# Patient Record
Sex: FEMALE
[De-identification: names, ages, dates, MRNs, and addresses within clinical notes are randomized per-mention and may not be internally consistent; named-entity substitution may affect disease eponyms.]

---

## 2024-06-19 PROBLEM — Z00.00 ENCOUNTER FOR PREVENTIVE HEALTH EXAMINATION: Status: ACTIVE | Noted: 2024-06-19

## 2024-06-25 ENCOUNTER — APPOINTMENT (OUTPATIENT)
Dept: INTERNAL MEDICINE | Facility: CLINIC | Age: 24
End: 2024-06-25
Payer: COMMERCIAL

## 2024-06-25 ENCOUNTER — NON-APPOINTMENT (OUTPATIENT)
Age: 24
End: 2024-06-25

## 2024-06-25 VITALS
DIASTOLIC BLOOD PRESSURE: 73 MMHG | HEIGHT: 70 IN | TEMPERATURE: 98.1 F | BODY MASS INDEX: 36.22 KG/M2 | SYSTOLIC BLOOD PRESSURE: 112 MMHG | WEIGHT: 253 LBS | HEART RATE: 91 BPM

## 2024-06-25 DIAGNOSIS — R06.02 SHORTNESS OF BREATH: ICD-10-CM

## 2024-06-25 DIAGNOSIS — Z82.49 FAMILY HISTORY OF ISCHEMIC HEART DISEASE AND OTHER DISEASES OF THE CIRCULATORY SYSTEM: ICD-10-CM

## 2024-06-25 DIAGNOSIS — R55 SYNCOPE AND COLLAPSE: ICD-10-CM

## 2024-06-25 DIAGNOSIS — Z80.3 FAMILY HISTORY OF MALIGNANT NEOPLASM OF BREAST: ICD-10-CM

## 2024-06-25 DIAGNOSIS — E66.9 OBESITY, UNSPECIFIED: ICD-10-CM

## 2024-06-25 DIAGNOSIS — N62 HYPERTROPHY OF BREAST: ICD-10-CM

## 2024-06-25 DIAGNOSIS — Z01.818 ENCOUNTER FOR OTHER PREPROCEDURAL EXAMINATION: ICD-10-CM

## 2024-06-25 DIAGNOSIS — Z87.39 PERSONAL HISTORY OF OTHER DISEASES OF THE MUSCULOSKELETAL SYSTEM AND CONNECTIVE TISSUE: ICD-10-CM

## 2024-06-25 DIAGNOSIS — Z86.59 PERSONAL HISTORY OF OTHER MENTAL AND BEHAVIORAL DISORDERS: ICD-10-CM

## 2024-06-25 PROCEDURE — 36415 COLL VENOUS BLD VENIPUNCTURE: CPT

## 2024-06-25 PROCEDURE — G2211 COMPLEX E/M VISIT ADD ON: CPT

## 2024-06-25 PROCEDURE — 99204 OFFICE O/P NEW MOD 45 MIN: CPT

## 2024-06-25 RX ORDER — METHYLPHENIDATE HYDROCHLORIDE 36 MG/1
36 TABLET, EXTENDED RELEASE ORAL DAILY
Refills: 0 | Status: ACTIVE | COMMUNITY

## 2024-06-26 PROBLEM — R06.02 SHORTNESS OF BREATH ON EXERTION: Status: ACTIVE | Noted: 2024-06-26

## 2024-06-26 PROBLEM — R55 PRE-SYNCOPE: Status: ACTIVE | Noted: 2024-06-26

## 2024-06-26 PROBLEM — E66.9 OBESITY: Status: ACTIVE | Noted: 2024-06-26

## 2024-06-26 LAB
ALBUMIN SERPL ELPH-MCNC: 4.6 G/DL
ALP BLD-CCNC: 54 U/L
ALT SERPL-CCNC: 15 U/L
ANION GAP SERPL CALC-SCNC: 13 MMOL/L
APPEARANCE: CLEAR
APTT BLD: 33.1 SEC
AST SERPL-CCNC: 19 U/L
BILIRUB SERPL-MCNC: 0.4 MG/DL
BILIRUBIN URINE: NEGATIVE
BLOOD URINE: NEGATIVE
BUN SERPL-MCNC: 12 MG/DL
CALCIUM SERPL-MCNC: 9.6 MG/DL
CHLORIDE SERPL-SCNC: 104 MMOL/L
CHOLEST SERPL-MCNC: 185 MG/DL
CO2 SERPL-SCNC: 22 MMOL/L
COLOR: YELLOW
CREAT SERPL-MCNC: 0.97 MG/DL
EGFR: 85 ML/MIN/1.73M2
ESTIMATED AVERAGE GLUCOSE: 94 MG/DL
GLUCOSE QUALITATIVE U: NEGATIVE MG/DL
GLUCOSE SERPL-MCNC: 90 MG/DL
HBA1C MFR BLD HPLC: 4.9 %
HCG SERPL-MCNC: <1 MIU/ML
HCT VFR BLD CALC: 40.4 %
HDLC SERPL-MCNC: 76 MG/DL
HGB BLD-MCNC: 13.2 G/DL
INR PPP: 0.95 RATIO
KETONES URINE: NEGATIVE MG/DL
LDLC SERPL CALC-MCNC: 100 MG/DL
LEUKOCYTE ESTERASE URINE: NEGATIVE
MCHC RBC-ENTMCNC: 31.4 PG
MCHC RBC-ENTMCNC: 32.7 GM/DL
MCV RBC AUTO: 96.2 FL
NITRITE URINE: NEGATIVE
NONHDLC SERPL-MCNC: 109 MG/DL
PH URINE: 6
PLATELET # BLD AUTO: 393 K/UL
POTASSIUM SERPL-SCNC: 4.6 MMOL/L
PROT SERPL-MCNC: 7.9 G/DL
PROTEIN URINE: NEGATIVE MG/DL
PT BLD: 10.7 SEC
RBC # BLD: 4.2 M/UL
RBC # FLD: 12.5 %
SODIUM SERPL-SCNC: 140 MMOL/L
SPECIFIC GRAVITY URINE: 1.02
TRIGL SERPL-MCNC: 44 MG/DL
UROBILINOGEN URINE: 0.2 MG/DL
WBC # FLD AUTO: 5.01 K/UL

## 2024-07-17 ENCOUNTER — APPOINTMENT (OUTPATIENT)
Dept: INTERNAL MEDICINE | Facility: CLINIC | Age: 24
End: 2024-07-17

## 2024-07-29 ENCOUNTER — APPOINTMENT (OUTPATIENT)
Dept: INTERNAL MEDICINE | Facility: CLINIC | Age: 24
End: 2024-07-29
Payer: COMMERCIAL

## 2024-07-29 ENCOUNTER — NON-APPOINTMENT (OUTPATIENT)
Age: 24
End: 2024-07-29

## 2024-07-29 DIAGNOSIS — Z01.818 ENCOUNTER FOR OTHER PREPROCEDURAL EXAMINATION: ICD-10-CM

## 2024-07-29 PROCEDURE — G2211 COMPLEX E/M VISIT ADD ON: CPT

## 2024-07-29 PROCEDURE — 36415 COLL VENOUS BLD VENIPUNCTURE: CPT

## 2024-07-29 PROCEDURE — 99213 OFFICE O/P EST LOW 20 MIN: CPT

## 2024-07-30 ENCOUNTER — APPOINTMENT (OUTPATIENT)
Dept: HEART AND VASCULAR | Facility: CLINIC | Age: 24
End: 2024-07-30
Payer: COMMERCIAL

## 2024-07-30 ENCOUNTER — NON-APPOINTMENT (OUTPATIENT)
Age: 24
End: 2024-07-30

## 2024-07-30 VITALS
WEIGHT: 256 LBS | HEART RATE: 71 BPM | HEIGHT: 70 IN | DIASTOLIC BLOOD PRESSURE: 94 MMHG | BODY MASS INDEX: 36.65 KG/M2 | SYSTOLIC BLOOD PRESSURE: 121 MMHG

## 2024-07-30 VITALS — DIASTOLIC BLOOD PRESSURE: 80 MMHG | SYSTOLIC BLOOD PRESSURE: 95 MMHG

## 2024-07-30 DIAGNOSIS — R55 SYNCOPE AND COLLAPSE: ICD-10-CM

## 2024-07-30 DIAGNOSIS — R79.89 OTHER SPECIFIED ABNORMAL FINDINGS OF BLOOD CHEMISTRY: ICD-10-CM

## 2024-07-30 DIAGNOSIS — E66.9 OBESITY, UNSPECIFIED: ICD-10-CM

## 2024-07-30 LAB
ALBUMIN SERPL ELPH-MCNC: 4.6 G/DL
ALP BLD-CCNC: 63 U/L
ALT SERPL-CCNC: 15 U/L
ANION GAP SERPL CALC-SCNC: 14 MMOL/L
APTT BLD: 31.3 SEC
AST SERPL-CCNC: 17 U/L
BILIRUB SERPL-MCNC: 0.6 MG/DL
BUN SERPL-MCNC: 10 MG/DL
CALCIUM SERPL-MCNC: 9.4 MG/DL
CHLORIDE SERPL-SCNC: 102 MMOL/L
CO2 SERPL-SCNC: 22 MMOL/L
CREAT SERPL-MCNC: 0.82 MG/DL
EGFR: 104 ML/MIN/1.73M2
GLUCOSE SERPL-MCNC: 87 MG/DL
HCT VFR BLD CALC: 41 %
HGB BLD-MCNC: 12.9 G/DL
INR PPP: 0.9 RATIO
MCHC RBC-ENTMCNC: 31 PG
MCHC RBC-ENTMCNC: 31.5 GM/DL
MCV RBC AUTO: 98.6 FL
PLATELET # BLD AUTO: 351 K/UL
POTASSIUM SERPL-SCNC: 4.4 MMOL/L
PROT SERPL-MCNC: 7.4 G/DL
PT BLD: 10.2 SEC
RBC # BLD: 4.16 M/UL
RBC # FLD: 12.3 %
SODIUM SERPL-SCNC: 138 MMOL/L
WBC # FLD AUTO: 6.36 K/UL

## 2024-07-30 PROCEDURE — 93000 ELECTROCARDIOGRAM COMPLETE: CPT

## 2024-07-30 PROCEDURE — 93306 TTE W/DOPPLER COMPLETE: CPT

## 2024-07-30 PROCEDURE — ZZZZZ: CPT

## 2024-07-30 PROCEDURE — 99204 OFFICE O/P NEW MOD 45 MIN: CPT

## 2024-07-30 PROCEDURE — G2211 COMPLEX E/M VISIT ADD ON: CPT

## 2024-07-30 NOTE — COUNSELING
[Weight management counseling provided] : Weight management [Healthy eating counseling provided] : healthy eating [Activity counseling provided] : activity [Behavioral health counseling provided] : behavioral health  [Good understanding] : Patient has a good understanding of disease, goals and obesity follow-up plan [Sleep ___ hours/day] : Sleep [unfilled] hours/day [Engage in a relaxing activity] : Engage in a relaxing activity [Plan in advance] : Plan in advance [de-identified] : Advised to stop vaping marijuana

## 2024-07-30 NOTE — REVIEW OF SYSTEMS
[Feeling Fatigued] : feeling fatigued [Dizziness] : dizziness [Negative] : Psychiatric [Headache] : no headache [Weight Gain (___ Lbs)] : no recent weight gain [Weight Loss (___ Lbs)] : no recent weight loss

## 2024-07-30 NOTE — COUNSELING
[Weight management counseling provided] : Weight management [Healthy eating counseling provided] : healthy eating [Activity counseling provided] : activity [Behavioral health counseling provided] : behavioral health  [Good understanding] : Patient has a good understanding of disease, goals and obesity follow-up plan [Sleep ___ hours/day] : Sleep [unfilled] hours/day [Engage in a relaxing activity] : Engage in a relaxing activity [Plan in advance] : Plan in advance [de-identified] : Advised to stop vaping marijuana

## 2024-07-30 NOTE — HISTORY OF PRESENT ILLNESS
[de-identified] : 7 blocks - limitation is back pain  [No Pertinent Cardiac History] : no history of aortic stenosis, atrial fibrillation, coronary artery disease, recent myocardial infarction, or implantable device/pacemaker [No Pertinent Pulmonary History] : no history of asthma, COPD, sleep apnea, or smoking [Smoker] : smoker [No Adverse Anesthesia Reaction] : no adverse anesthesia reaction in self or family member [(Patient denies any chest pain, claudication, dyspnea on exertion, orthopnea, palpitations or syncope)] : Patient denies any chest pain, claudication, dyspnea on exertion, orthopnea, palpitations or syncope [____ METs%] : [unfilled] METs% [Good (7-10 METs)] : Good (7-10 METs) [Aortic Stenosis] : no aortic stenosis [Atrial Fibrillation] : no atrial fibrillation [Coronary Artery Disease] : no coronary artery disease [Recent Myocardial Infarction] : no recent myocardial infarction [Implantable Device/Pacemaker] : no implantable device/pacemaker [Asthma] : no asthma [COPD] : no COPD [Sleep Apnea] : no sleep apnea [Family Member] : no family member with adverse anesthesia reaction/sudden death [Self] : no previous adverse anesthesia reaction [Chronic Anticoagulation] : no chronic anticoagulation [Chronic Kidney Disease] : no chronic kidney disease [Diabetes] : no diabetes [FreeTextEntry1] : Bilateral breast reduction  [FreeTextEntry2] : 08/07/2024 [FreeTextEntry3] : Donovan Lock MD [FreeTextEntry4] : Katelin is a 25yo woman who has obesity and macromastia with subsequent chronic back pain who first presented to be on 06/25/24 for preop for bilateral breast reduction surgery. She's now following up for a new preop as she has changed her surgeon. She's also requesting a letter of medical necessity for this surgery.   #Macromastia #Chronic back pain Has chronic thoracic dull back pain for years contributed by her breast size This also affects her ability to breathe well upon on exertion Ubers everywhere as unable to walk a lot Planning above procedure. Preop to be faxed to [phone].  #Chronic lower back pain Describes intermittent sharp mid-lumbosacral back pain exacerbated by bending Recalls MRI done in the past which showed herniated disc but not much was done about it  #Obesity (BMI>36) Baseline around 225 lbs in Nov 2023, now ~250 lbs Interested in seeking weight loss management   #History of pre-syncope x years Event happens every couple months or so e.g. during a shower she felt she could not hear (like "under water"), developed tunnel vision, felt lightheaded, got out of the shower, sat down, and then got better No LOC  #Psych Hx Has ADHD and is on Concerta Faced some issues with refills and has not been taking it regularly  She vapes marijuana (labelled as smoker below for this reason) but has not done so in 2 weeks  [FreeTextEntry6] : Able to walk ~7 blocks without symptoms that get limited by back pain  [FreeTextEntry7] : Interval History: Saw cardiology Dr. Nicolas Caldwell on 7/30/34 for periodic dizziness and lightheadedness and his assessment pasted below.   "Patient's clinical presentation is most consistent with near syncope due to vasovagal prodrome typically she gets symptoms when standing for prolonged periods of time and transiently probably has drop in blood pressure she also has coexisting symptoms of some nausea and some diaphoresis. LV function based on echo was normal EKG revealed no ST-T wave changes. Nonpharmacological treatment at this point would include hydration at 2 to 3 L/day as well as salt liberalization patient apparently does not use salt in her food liberalizing salt intake would help raise her blood pressure and prevent the symptoms. There is clinically no reason she cannot proceed with the planned breast reduction procedure and she is optimized and cleared for planned surgery with general anesthesia."

## 2024-07-30 NOTE — HEALTH RISK ASSESSMENT
[2 - 4 times a month (2 pts)] : 2-4 times a month (2 points) [1 or 2 (0 pts)] : 1 or 2 (0 points) [Less than monthly (1 pt)] : Less than monthly (1 point) [Yes] : In the past 12 months have you used drugs other than those required for medical reasons? Yes [Audit-CScore] : 3 [de-identified] : Marijuana vaping but in the last 2 weeks  [Never] : Never [de-identified] : Vapes marijuana but has not done so in the last 2 weeks

## 2024-07-30 NOTE — REVIEW OF SYSTEMS
[Chest Pain] : no chest pain [Palpitations] : no palpitations [Leg Claudication] : no leg claudication [Lower Ext Edema] : no lower extremity edema [Orthopnea] : no orthopnea [Paroxysmal Nocturnal Dyspnea] : no paroxysmal nocturnal dyspnea [Fainting] : no fainting [Fatigue] : fatigue [Shortness Of Breath] : shortness of breath [Dyspnea on Exertion] : dyspnea on exertion [Heartburn] : heartburn [Joint Pain] : joint pain [Back Pain] : back pain [Headache] : headache [Dizziness] : dizziness [Fever] : no fever [Chills] : no chills [Recent Change In Weight] : ~T no recent weight change [Sore Throat] : no sore throat [Wheezing] : no wheezing [Cough] : no cough [Abdominal Pain] : no abdominal pain [Nausea] : no nausea [Constipation] : no constipation [Diarrhea] : diarrhea [Vomiting] : no vomiting [Dysuria] : no dysuria [Hematuria] : no hematuria [Muscle Pain] : no muscle pain [Itching] : no itching [Skin Rash] : no skin rash [Depression] : no depression [Swollen Glands] : no swollen glands [FreeTextEntry6] : Symptoms related to macromastia  [FreeTextEntry9] : Knee pain  [FreeTextEntry7] : Occasional heartburn. bloating, burps [de-identified] : Monthly headaches

## 2024-07-30 NOTE — HISTORY OF PRESENT ILLNESS
[de-identified] : 7 blocks - limitation is back pain  [No Pertinent Cardiac History] : no history of aortic stenosis, atrial fibrillation, coronary artery disease, recent myocardial infarction, or implantable device/pacemaker [No Pertinent Pulmonary History] : no history of asthma, COPD, sleep apnea, or smoking [Smoker] : smoker [No Adverse Anesthesia Reaction] : no adverse anesthesia reaction in self or family member [(Patient denies any chest pain, claudication, dyspnea on exertion, orthopnea, palpitations or syncope)] : Patient denies any chest pain, claudication, dyspnea on exertion, orthopnea, palpitations or syncope [____ METs%] : [unfilled] METs% [Good (7-10 METs)] : Good (7-10 METs) [Aortic Stenosis] : no aortic stenosis [Atrial Fibrillation] : no atrial fibrillation [Coronary Artery Disease] : no coronary artery disease [Recent Myocardial Infarction] : no recent myocardial infarction [Implantable Device/Pacemaker] : no implantable device/pacemaker [Asthma] : no asthma [COPD] : no COPD [Sleep Apnea] : no sleep apnea [Family Member] : no family member with adverse anesthesia reaction/sudden death [Self] : no previous adverse anesthesia reaction [Chronic Anticoagulation] : no chronic anticoagulation [Chronic Kidney Disease] : no chronic kidney disease [Diabetes] : no diabetes [FreeTextEntry1] : Bilateral breast reduction  [FreeTextEntry2] : 08/07/2024 [FreeTextEntry3] : Donovan Lock MD [FreeTextEntry4] : Katelin is a 25yo woman who has obesity and macromastia with subsequent chronic back pain who first presented to be on 06/25/24 for preop for bilateral breast reduction surgery. She's now following up for a new preop as she has changed her surgeon. She's also requesting a letter of medical necessity for this surgery.   #Macromastia #Chronic back pain Has chronic thoracic dull back pain for years contributed by her breast size This also affects her ability to breathe well upon on exertion Ubers everywhere as unable to walk a lot Planning above procedure. Preop to be faxed to [phone].  #Chronic lower back pain Describes intermittent sharp mid-lumbosacral back pain exacerbated by bending Recalls MRI done in the past which showed herniated disc but not much was done about it  #Obesity (BMI>36) Baseline around 225 lbs in Nov 2023, now ~250 lbs Interested in seeking weight loss management   #History of pre-syncope x years Event happens every couple months or so e.g. during a shower she felt she could not hear (like "under water"), developed tunnel vision, felt lightheaded, got out of the shower, sat down, and then got better No LOC  #Psych Hx Has ADHD and is on Concerta Faced some issues with refills and has not been taking it regularly  She vapes marijuana (labelled as smoker below for this reason) but has not done so in 2 weeks  [FreeTextEntry6] : Able to walk ~7 blocks without symptoms that get limited by back pain  [FreeTextEntry7] : Interval History: Saw cardiology Dr. Nicolas Caldwell on 7/30/34 for periodic dizziness and lightheadedness and his assessment pasted below.   "Patient's clinical presentation is most consistent with near syncope due to vasovagal prodrome typically she gets symptoms when standing for prolonged periods of time and transiently probably has drop in blood pressure she also has coexisting symptoms of some nausea and some diaphoresis. LV function based on echo was normal EKG revealed no ST-T wave changes. Nonpharmacological treatment at this point would include hydration at 2 to 3 L/day as well as salt liberalization patient apparently does not use salt in her food liberalizing salt intake would help raise her blood pressure and prevent the symptoms. There is clinically no reason she cannot proceed with the planned breast reduction procedure and she is optimized and cleared for planned surgery with general anesthesia."

## 2024-07-30 NOTE — REVIEW OF SYSTEMS
[Chest Pain] : no chest pain [Palpitations] : no palpitations [Leg Claudication] : no leg claudication [Lower Ext Edema] : no lower extremity edema [Orthopnea] : no orthopnea [Paroxysmal Nocturnal Dyspnea] : no paroxysmal nocturnal dyspnea [Fainting] : no fainting [Fatigue] : fatigue [Shortness Of Breath] : shortness of breath [Dyspnea on Exertion] : dyspnea on exertion [Heartburn] : heartburn [Joint Pain] : joint pain [Back Pain] : back pain [Headache] : headache [Dizziness] : dizziness [Fever] : no fever [Chills] : no chills [Recent Change In Weight] : ~T no recent weight change [Sore Throat] : no sore throat [Wheezing] : no wheezing [Cough] : no cough [Abdominal Pain] : no abdominal pain [Nausea] : no nausea [Constipation] : no constipation [Diarrhea] : diarrhea [Vomiting] : no vomiting [Dysuria] : no dysuria [Hematuria] : no hematuria [Muscle Pain] : no muscle pain [Itching] : no itching [Skin Rash] : no skin rash [Depression] : no depression [Swollen Glands] : no swollen glands [FreeTextEntry6] : Symptoms related to macromastia  [FreeTextEntry7] : Occasional heartburn. bloating, burps [FreeTextEntry9] : Knee pain  [de-identified] : Monthly headaches

## 2024-07-30 NOTE — PHYSICAL EXAM
[No Acute Distress] : no acute distress [Well Nourished] : well nourished [Well Developed] : well developed [Well-Appearing] : well-appearing [Normal Voice/Communication] : normal voice/communication [Normal Sclera/Conjunctiva] : normal sclera/conjunctiva [Normal Outer Ear/Nose] : the outer ears and nose were normal in appearance [Supple] : supple [No Respiratory Distress] : no respiratory distress  [No Accessory Muscle Use] : no accessory muscle use [Clear to Auscultation] : lungs were clear to auscultation bilaterally [Normal Rate] : normal rate  [Normal S1, S2] : normal S1 and S2 [No Edema] : there was no peripheral edema [Soft] : abdomen soft [Non Tender] : non-tender [Non-distended] : non-distended [No CVA Tenderness] : no CVA  tenderness [No Joint Swelling] : no joint swelling [No Rash] : no rash [Normal Gait] : normal gait [Speech Grossly Normal] : speech grossly normal [Normal Affect] : the affect was normal [Normal Insight/Judgement] : insight and judgment were intact [de-identified] : Obese body habitus  [de-identified] : Mid-lumbosacral tenderness to deep palpation

## 2024-07-30 NOTE — HEALTH RISK ASSESSMENT
[2 - 4 times a month (2 pts)] : 2-4 times a month (2 points) [1 or 2 (0 pts)] : 1 or 2 (0 points) [Less than monthly (1 pt)] : Less than monthly (1 point) [Yes] : In the past 12 months have you used drugs other than those required for medical reasons? Yes [Audit-CScore] : 3 [de-identified] : Marijuana vaping but in the last 2 weeks  [Never] : Never [de-identified] : Vapes marijuana but has not done so in the last 2 weeks

## 2024-07-30 NOTE — ADDENDUM
[FreeTextEntry1] : Gabe Cloud assisted in documentation on 07/30/24 acting as a scribe for Dr. Nicolas Caldwell.

## 2024-07-30 NOTE — HISTORY OF PRESENT ILLNESS
[FreeTextEntry1] : 07/30/24 JORGITO HILLMAN is 24 year she with prior h/o   (- ) HTN  (- ) AODM  (vapping  ) smoker  ( - lipids ( ++) obesity (- ) fam hx of CAD mother has breast cancer  preop breast reduction was sent for Preop CV assessment  has had periodic dizziness and lightness  pattern is related to prolong standing  has had near syncope with blurred vision  has prodrome of heat and nausea  has trigger related to blood draw  better with sitting and lying down

## 2024-07-30 NOTE — REVIEW OF SYSTEMS
[Chest Pain] : no chest pain [Palpitations] : no palpitations [Leg Claudication] : no leg claudication [Lower Ext Edema] : no lower extremity edema [Orthopnea] : no orthopnea [Paroxysmal Nocturnal Dyspnea] : no paroxysmal nocturnal dyspnea [Fainting] : no fainting [Fatigue] : fatigue [Shortness Of Breath] : shortness of breath [Dyspnea on Exertion] : dyspnea on exertion [Heartburn] : heartburn [Joint Pain] : joint pain [Back Pain] : back pain [Headache] : headache [Dizziness] : dizziness [Fever] : no fever [Chills] : no chills [Recent Change In Weight] : ~T no recent weight change [Sore Throat] : no sore throat [Wheezing] : no wheezing [Cough] : no cough [Abdominal Pain] : no abdominal pain [Nausea] : no nausea [Constipation] : no constipation [Diarrhea] : diarrhea [Vomiting] : no vomiting [Dysuria] : no dysuria [Hematuria] : no hematuria [Muscle Pain] : no muscle pain [Itching] : no itching [Skin Rash] : no skin rash [Depression] : no depression [Swollen Glands] : no swollen glands [FreeTextEntry6] : Symptoms related to macromastia  [FreeTextEntry9] : Knee pain  [FreeTextEntry7] : Occasional heartburn. bloating, burps [de-identified] : Monthly headaches

## 2024-07-30 NOTE — HISTORY OF PRESENT ILLNESS
[de-identified] : 7 blocks - limitation is back pain  [No Pertinent Cardiac History] : no history of aortic stenosis, atrial fibrillation, coronary artery disease, recent myocardial infarction, or implantable device/pacemaker [No Pertinent Pulmonary History] : no history of asthma, COPD, sleep apnea, or smoking [Smoker] : smoker [No Adverse Anesthesia Reaction] : no adverse anesthesia reaction in self or family member [(Patient denies any chest pain, claudication, dyspnea on exertion, orthopnea, palpitations or syncope)] : Patient denies any chest pain, claudication, dyspnea on exertion, orthopnea, palpitations or syncope [____ METs%] : [unfilled] METs% [Good (7-10 METs)] : Good (7-10 METs) [Aortic Stenosis] : no aortic stenosis [Atrial Fibrillation] : no atrial fibrillation [Coronary Artery Disease] : no coronary artery disease [Recent Myocardial Infarction] : no recent myocardial infarction [Implantable Device/Pacemaker] : no implantable device/pacemaker [Asthma] : no asthma [COPD] : no COPD [Sleep Apnea] : no sleep apnea [Family Member] : no family member with adverse anesthesia reaction/sudden death [Self] : no previous adverse anesthesia reaction [Chronic Anticoagulation] : no chronic anticoagulation [Chronic Kidney Disease] : no chronic kidney disease [Diabetes] : no diabetes [FreeTextEntry1] : Bilateral breast reduction  [FreeTextEntry2] : 08/07/2024 [FreeTextEntry3] : Donovan Lock MD [FreeTextEntry4] : Katelin is a 23yo woman who has obesity and macromastia with subsequent chronic back pain who first presented to be on 06/25/24 for preop for bilateral breast reduction surgery. She's now following up for a new preop as she has changed her surgeon. She's also requesting a letter of medical necessity for this surgery.   #Macromastia #Chronic back pain Has chronic thoracic dull back pain for years contributed by her breast size This also affects her ability to breathe well upon on exertion Ubers everywhere as unable to walk a lot Planning above procedure. Preop to be faxed to [phone].  #Chronic lower back pain Describes intermittent sharp mid-lumbosacral back pain exacerbated by bending Recalls MRI done in the past which showed herniated disc but not much was done about it  #Obesity (BMI>36) Baseline around 225 lbs in Nov 2023, now ~250 lbs Interested in seeking weight loss management   #History of pre-syncope x years Event happens every couple months or so e.g. during a shower she felt she could not hear (like "under water"), developed tunnel vision, felt lightheaded, got out of the shower, sat down, and then got better No LOC  #Psych Hx Has ADHD and is on Concerta Faced some issues with refills and has not been taking it regularly  She vapes marijuana (labelled as smoker below for this reason) but has not done so in 2 weeks  [FreeTextEntry6] : Able to walk ~7 blocks without symptoms that get limited by back pain  [FreeTextEntry7] : Interval History: Saw cardiology Dr. Nicolas Caldwell on 7/30/34 for periodic dizziness and lightheadedness and his assessment pasted below.   "Patient's clinical presentation is most consistent with near syncope due to vasovagal prodrome typically she gets symptoms when standing for prolonged periods of time and transiently probably has drop in blood pressure she also has coexisting symptoms of some nausea and some diaphoresis. LV function based on echo was normal EKG revealed no ST-T wave changes. Nonpharmacological treatment at this point would include hydration at 2 to 3 L/day as well as salt liberalization patient apparently does not use salt in her food liberalizing salt intake would help raise her blood pressure and prevent the symptoms. There is clinically no reason she cannot proceed with the planned breast reduction procedure and she is optimized and cleared for planned surgery with general anesthesia."

## 2024-07-30 NOTE — PHYSICAL EXAM
[No Acute Distress] : no acute distress [Well Nourished] : well nourished [Well Developed] : well developed [Well-Appearing] : well-appearing [Normal Voice/Communication] : normal voice/communication [Normal Sclera/Conjunctiva] : normal sclera/conjunctiva [Normal Outer Ear/Nose] : the outer ears and nose were normal in appearance [Supple] : supple [No Respiratory Distress] : no respiratory distress  [No Accessory Muscle Use] : no accessory muscle use [Clear to Auscultation] : lungs were clear to auscultation bilaterally [Normal Rate] : normal rate  [Normal S1, S2] : normal S1 and S2 [No Edema] : there was no peripheral edema [Soft] : abdomen soft [Non Tender] : non-tender [Non-distended] : non-distended [No CVA Tenderness] : no CVA  tenderness [No Joint Swelling] : no joint swelling [No Rash] : no rash [Normal Gait] : normal gait [Speech Grossly Normal] : speech grossly normal [Normal Affect] : the affect was normal [Normal Insight/Judgement] : insight and judgment were intact [de-identified] : Obese body habitus  [de-identified] : Mid-lumbosacral tenderness to deep palpation

## 2024-07-30 NOTE — COUNSELING
[Weight management counseling provided] : Weight management [Healthy eating counseling provided] : healthy eating [Activity counseling provided] : activity [Behavioral health counseling provided] : behavioral health  [Good understanding] : Patient has a good understanding of disease, goals and obesity follow-up plan [Sleep ___ hours/day] : Sleep [unfilled] hours/day [Engage in a relaxing activity] : Engage in a relaxing activity [Plan in advance] : Plan in advance [de-identified] : Advised to stop vaping marijuana

## 2024-07-30 NOTE — ASSESSMENT
[FreeTextEntry1] : Patient's clinical presentation is most consistent with near syncope due to vasovagal prodrome typically she gets symptoms when standing for prolonged periods of time and transiently probably has drop in blood pressure she also has coexisting symptoms of some nausea and some diaphoresis.  LV function based on echo was normal EKG revealed no ST-T wave changes.  Nonpharmacological treatment at this point would include hydration at 2 to 3 L/day as well as salt liberalization patient apparently does not use salt in her food liberalizing salt intake would help raise her blood pressure and prevent the symptoms.  There is clinically no reason she cannot proceed with the planned breast reduction procedure and she is optimized and cleared for planned surgery with general anesthesia.

## 2024-07-30 NOTE — ASSESSMENT
[Patient Optimized for Surgery] : Patient optimized for surgery [No Further Testing Recommended] : no further testing recommended [As per surgery] : as per surgery [FreeTextEntry4] : She's as low risk for this low risk surgery

## 2024-07-30 NOTE — HEALTH RISK ASSESSMENT
[2 - 4 times a month (2 pts)] : 2-4 times a month (2 points) [1 or 2 (0 pts)] : 1 or 2 (0 points) [Less than monthly (1 pt)] : Less than monthly (1 point) [Yes] : In the past 12 months have you used drugs other than those required for medical reasons? Yes [Audit-CScore] : 3 [de-identified] : Marijuana vaping but in the last 2 weeks  [Never] : Never [de-identified] : Vapes marijuana but has not done so in the last 2 weeks

## 2024-07-31 ENCOUNTER — APPOINTMENT (OUTPATIENT)
Dept: HEART AND VASCULAR | Facility: CLINIC | Age: 24
End: 2024-07-31

## 2024-08-01 LAB
FERRITIN SERPL-MCNC: 83 NG/ML
FOLATE SERPL-MCNC: 5.5 NG/ML
IRON SATN MFR SERPL: 26 %
IRON SERPL-MCNC: 89 UG/DL
TIBC SERPL-MCNC: 335 UG/DL
UIBC SERPL-MCNC: 246 UG/DL
VIT B12 SERPL-MCNC: 430 PG/ML

## 2024-10-10 ENCOUNTER — APPOINTMENT (OUTPATIENT)
Dept: INTERNAL MEDICINE | Facility: CLINIC | Age: 24
End: 2024-10-10
Payer: COMMERCIAL

## 2024-10-10 DIAGNOSIS — Z02.89 ENCOUNTER FOR OTHER ADMINISTRATIVE EXAMINATIONS: ICD-10-CM

## 2024-10-10 PROCEDURE — 99212 OFFICE O/P EST SF 10 MIN: CPT | Mod: 95

## 2024-10-10 PROCEDURE — G2211 COMPLEX E/M VISIT ADD ON: CPT | Mod: 95

## 2024-10-11 ENCOUNTER — TRANSCRIPTION ENCOUNTER (OUTPATIENT)
Age: 24
End: 2024-10-11